# Patient Record
Sex: MALE | Race: BLACK OR AFRICAN AMERICAN | NOT HISPANIC OR LATINO | URBAN - METROPOLITAN AREA
[De-identification: names, ages, dates, MRNs, and addresses within clinical notes are randomized per-mention and may not be internally consistent; named-entity substitution may affect disease eponyms.]

---

## 2020-04-06 ENCOUNTER — EMERGENCY (EMERGENCY)
Facility: HOSPITAL | Age: 50
LOS: 1 days | Discharge: HOME | End: 2020-04-06
Attending: STUDENT IN AN ORGANIZED HEALTH CARE EDUCATION/TRAINING PROGRAM | Admitting: EMERGENCY MEDICINE
Payer: COMMERCIAL

## 2020-04-06 VITALS
HEART RATE: 60 BPM | DIASTOLIC BLOOD PRESSURE: 85 MMHG | OXYGEN SATURATION: 99 % | RESPIRATION RATE: 16 BRPM | SYSTOLIC BLOOD PRESSURE: 123 MMHG | TEMPERATURE: 98 F

## 2020-04-06 VITALS
OXYGEN SATURATION: 98 % | TEMPERATURE: 97 F | DIASTOLIC BLOOD PRESSURE: 71 MMHG | SYSTOLIC BLOOD PRESSURE: 130 MMHG | HEART RATE: 65 BPM | RESPIRATION RATE: 16 BRPM

## 2020-04-06 DIAGNOSIS — R19.7 DIARRHEA, UNSPECIFIED: ICD-10-CM

## 2020-04-06 DIAGNOSIS — R10.13 EPIGASTRIC PAIN: ICD-10-CM

## 2020-04-06 DIAGNOSIS — R91.1 SOLITARY PULMONARY NODULE: ICD-10-CM

## 2020-04-06 DIAGNOSIS — B20 HUMAN IMMUNODEFICIENCY VIRUS [HIV] DISEASE: ICD-10-CM

## 2020-04-06 LAB
ALBUMIN SERPL ELPH-MCNC: 3.8 G/DL — SIGNIFICANT CHANGE UP (ref 3.4–5)
ALP SERPL-CCNC: 99 U/L — SIGNIFICANT CHANGE UP (ref 40–120)
ALT FLD-CCNC: 53 U/L — HIGH (ref 12–42)
ANION GAP SERPL CALC-SCNC: 8 MMOL/L — LOW (ref 9–16)
AST SERPL-CCNC: 40 U/L — HIGH (ref 15–37)
BASOPHILS # BLD AUTO: 0.04 K/UL — SIGNIFICANT CHANGE UP (ref 0–0.2)
BASOPHILS NFR BLD AUTO: 1.1 % — SIGNIFICANT CHANGE UP (ref 0–2)
BILIRUB SERPL-MCNC: 0.2 MG/DL — SIGNIFICANT CHANGE UP (ref 0.2–1.2)
BUN SERPL-MCNC: 23 MG/DL — SIGNIFICANT CHANGE UP (ref 7–23)
CALCIUM SERPL-MCNC: 8.7 MG/DL — SIGNIFICANT CHANGE UP (ref 8.5–10.5)
CHLORIDE SERPL-SCNC: 104 MMOL/L — SIGNIFICANT CHANGE UP (ref 96–108)
CO2 SERPL-SCNC: 23 MMOL/L — SIGNIFICANT CHANGE UP (ref 22–31)
CREAT SERPL-MCNC: 1.1 MG/DL — SIGNIFICANT CHANGE UP (ref 0.5–1.3)
EOSINOPHIL # BLD AUTO: 0.09 K/UL — SIGNIFICANT CHANGE UP (ref 0–0.5)
EOSINOPHIL NFR BLD AUTO: 2.4 % — SIGNIFICANT CHANGE UP (ref 0–6)
GLUCOSE SERPL-MCNC: 80 MG/DL — SIGNIFICANT CHANGE UP (ref 70–99)
HCT VFR BLD CALC: 45.6 % — SIGNIFICANT CHANGE UP (ref 39–50)
HGB BLD-MCNC: 15.6 G/DL — SIGNIFICANT CHANGE UP (ref 13–17)
IMM GRANULOCYTES NFR BLD AUTO: 0.3 % — SIGNIFICANT CHANGE UP (ref 0–1.5)
LACTATE SERPL-SCNC: 0.4 MMOL/L — SIGNIFICANT CHANGE UP (ref 0.4–2)
LIDOCAIN IGE QN: 131 U/L — SIGNIFICANT CHANGE UP (ref 73–393)
LYMPHOCYTES # BLD AUTO: 1.62 K/UL — SIGNIFICANT CHANGE UP (ref 1–3.3)
LYMPHOCYTES # BLD AUTO: 42.6 % — SIGNIFICANT CHANGE UP (ref 13–44)
MCHC RBC-ENTMCNC: 30.7 PG — SIGNIFICANT CHANGE UP (ref 27–34)
MCHC RBC-ENTMCNC: 34.2 GM/DL — SIGNIFICANT CHANGE UP (ref 32–36)
MCV RBC AUTO: 89.8 FL — SIGNIFICANT CHANGE UP (ref 80–100)
MONOCYTES # BLD AUTO: 0.61 K/UL — SIGNIFICANT CHANGE UP (ref 0–0.9)
MONOCYTES NFR BLD AUTO: 16.1 % — HIGH (ref 2–14)
NEUTROPHILS # BLD AUTO: 1.43 K/UL — LOW (ref 1.8–7.4)
NEUTROPHILS NFR BLD AUTO: 37.5 % — LOW (ref 43–77)
NRBC # BLD: 0 /100 WBCS — SIGNIFICANT CHANGE UP (ref 0–0)
PLATELET # BLD AUTO: 372 K/UL — SIGNIFICANT CHANGE UP (ref 150–400)
POTASSIUM SERPL-MCNC: 4.6 MMOL/L — SIGNIFICANT CHANGE UP (ref 3.5–5.3)
POTASSIUM SERPL-SCNC: 4.6 MMOL/L — SIGNIFICANT CHANGE UP (ref 3.5–5.3)
PROT SERPL-MCNC: 7.6 G/DL — SIGNIFICANT CHANGE UP (ref 6.4–8.2)
RBC # BLD: 5.08 M/UL — SIGNIFICANT CHANGE UP (ref 4.2–5.8)
RBC # FLD: 12.1 % — SIGNIFICANT CHANGE UP (ref 10.3–14.5)
SODIUM SERPL-SCNC: 135 MMOL/L — SIGNIFICANT CHANGE UP (ref 132–145)
WBC # BLD: 3.8 K/UL — SIGNIFICANT CHANGE UP (ref 3.8–10.5)
WBC # FLD AUTO: 3.8 K/UL — SIGNIFICANT CHANGE UP (ref 3.8–10.5)

## 2020-04-06 PROCEDURE — 99285 EMERGENCY DEPT VISIT HI MDM: CPT

## 2020-04-06 PROCEDURE — 93010 ELECTROCARDIOGRAM REPORT: CPT

## 2020-04-06 PROCEDURE — 74177 CT ABD & PELVIS W/CONTRAST: CPT | Mod: 26

## 2020-04-06 PROCEDURE — 71045 X-RAY EXAM CHEST 1 VIEW: CPT | Mod: 26

## 2020-04-06 RX ORDER — SODIUM CHLORIDE 9 MG/ML
1000 INJECTION INTRAMUSCULAR; INTRAVENOUS; SUBCUTANEOUS ONCE
Refills: 0 | Status: COMPLETED | OUTPATIENT
Start: 2020-04-06 | End: 2020-04-06

## 2020-04-06 RX ORDER — ACETAMINOPHEN 500 MG
975 TABLET ORAL ONCE
Refills: 0 | Status: COMPLETED | OUTPATIENT
Start: 2020-04-06 | End: 2020-04-06

## 2020-04-06 RX ADMIN — Medication 975 MILLIGRAM(S): at 13:04

## 2020-04-06 RX ADMIN — SODIUM CHLORIDE 2000 MILLILITER(S): 9 INJECTION INTRAMUSCULAR; INTRAVENOUS; SUBCUTANEOUS at 13:04

## 2020-04-06 RX ADMIN — Medication 30 MILLILITER(S): at 13:04

## 2020-04-06 NOTE — ED PROVIDER NOTE - CLINICAL SUMMARY MEDICAL DECISION MAKING FREE TEXT BOX
pt w/ 3 days abd pain, diarrhea, possible foodborne illness, r/o colitis, pancreatitis, hb pathology, less likely gastritis  belly labs, lactate, ctap, will provide supportive care, serial exam and ED observation period

## 2020-04-06 NOTE — ED PROVIDER NOTE - PHYSICAL EXAMINATION
PHYSICAL EXAM:    Constitutional: awake, alert, NAD  Eyes: EOMI, no conj injection  HENT: NC AT  Back: no c/t/l spine ttp  Respiratory: no respiratory distress, breath sounds equal b/l, no wheezing, rhonchi or stridor.   Cardiovascular: RRR nml S1S2  Gastrointestinal: soft, no masses, mid abdominal tenderness from epigastric region to umbilicus, nondistended. No guarding or rebound.   Extremities: no peripheral edema  Neurological: AAOx3, CN II-XII grossly intact, no focal numbness or weakness  Skin: no rash  Musculoskeletal: no gross deformity

## 2020-04-06 NOTE — ED PROVIDER NOTE - PROGRESS NOTE DETAILS
pt feeling better, pain improved. serial abd exam benign. labs/ct w/o acute findings. discussed finding of lung nodule and need for outpt f/u. meds sent to pt's preferred pharmacy. return precautions addressed

## 2020-04-06 NOTE — ED PROVIDER NOTE - CARE PLAN
Principal Discharge DX:	Diarrhea, unspecified type  Secondary Diagnosis:	Lung nodule seen on imaging study

## 2020-04-06 NOTE — ED PROVIDER NOTE - OBJECTIVE STATEMENT
Patient is a 49 year old male presenting today with 3 days of abdominal pain from epigastric area inferiorly to the umbilicus. States that today is the worst day so far. He also reports watery diarrhea up to five or six times per day. He denies nausea and vomiting, no recent travels, no recent antibiotics, no fevers, chills, or body aches, denies dysuria and rashes. He denies h/o HTN or DM. Patient has a history of HIV on antivirals and reports viral load 96 and CD4 count over 400. He also reports h/o hepatitis C but is not yet being treated. Patient admits to smoking cigarettes, and illicit drug use including marijuana and cocaine; he denies IV drug use. He says he can tolerate PO intake, and that he is unsure if eating triggers the pain. Patient reports that about one week ago he ate some possibly  food. He relays that he took ibuprofen last night, and that he has not eaten today. Patient is a 49 year old male sasha HIV on RIOS (VL=96), Hep C presenting today with 3 days of abdominal pain from epigastric area inferiorly to the umbilicus. States that today is the worst day so far. He also reports watery diarrhea up to five or six times per day. He denies nausea and vomiting, no recent travels, no recent antibiotics, no fevers, chills, or body aches, denies dysuria and rashes. He denies h/o HTN or DM.  Patient admits to smoking cigarettes, and illicit drug use including marijuana and cocaine; he denies IV drug use. He says he can tolerate PO intake, and that he is unsure if eating triggers the pain. Patient reports that about one week ago he ate some possibly  food. He relays that he took ibuprofen last night which improved his pain, and that he has not eaten today.

## 2020-04-06 NOTE — ED PROVIDER NOTE - NSFOLLOWUPINSTRUCTIONS_ED_ALL_ED_FT
Take maalox for abdominal pain/diarrhea and bentyl as needed for abdominal pain. Have a bland diet until symptoms improve. Follow up with primary care doctor for further workup of lung nodule.  Return for worsening pain, concern for dehydration, fever or other concerning symptoms.    Abdominal Pain    Many things can cause abdominal pain. Many times, abdominal pain is not caused by a disease and will improve without treatment. Your health care provider will do a physical exam to determine if there is a dangerous cause of your pain; blood tests and imaging may help determine the cause of your pain. However, in many cases, no cause may be found and you may need further testing as an outpatient. Monitor your abdominal pain for any changes.     SEEK IMMEDIATE MEDICAL CARE IF YOU HAVE ANY OF THE FOLLOWING SYMPTOMS: worsening abdominal pain, uncontrollable vomiting, profuse diarrhea, inability to have bowel movements or pass gas, black or bloody stools, fever accompanying chest pain or back pain, or fainting. These symptoms may represent a serious problem that is an emergency. Do not wait to see if the symptoms will go away. Get medical help right away. Call 911 and do not drive yourself to the hospital.      Diarrhea    Diarrhea is frequent loose or watery bowel movements that has many causes. Diarrhea can make you feel weak and cause you to become dehydrated. Diarrhea typically lasts 2–3 days, but can last longer if it is a sign of something more serious. Drink clear fluids to prevent dehydration. Eat bland, easy-to-digest foods as tolerated.     SEEK IMMEDIATE MEDICAL CARE IF YOU HAVE ANY OF THE FOLLOWING SYMPTOMS: high fevers, lightheadedness/dizziness, chest pain, black or bloody stools, shortness of breath, severe abdominal or back pain, or any signs of dehydration.

## 2020-04-06 NOTE — ED PROVIDER NOTE - PATIENT PORTAL LINK FT
You can access the FollowMyHealth Patient Portal offered by [facility] by registering at the following website: [email or web address]. By joining [employer]’s FollowMyHealth portal, you will also be able to view your health information using other applications (apps) compatible with our system.

## 2020-04-06 NOTE — ED ADULT NURSE NOTE - OBJECTIVE STATEMENT
48 y/o M c/o abd pain x3 days with diarrhea. denies fevers, nausea, vomiting, sick contacts. hx hiv.

## 2023-06-21 ENCOUNTER — EMERGENCY (EMERGENCY)
Facility: HOSPITAL | Age: 53
LOS: 1 days | Discharge: HOME | End: 2023-06-21
Admitting: EMERGENCY MEDICINE
Payer: COMMERCIAL

## 2023-06-21 VITALS
DIASTOLIC BLOOD PRESSURE: 74 MMHG | RESPIRATION RATE: 16 BRPM | HEIGHT: 74 IN | HEART RATE: 75 BPM | SYSTOLIC BLOOD PRESSURE: 131 MMHG | OXYGEN SATURATION: 97 % | TEMPERATURE: 98 F | WEIGHT: 196.21 LBS

## 2023-06-21 PROBLEM — B19.20 UNSPECIFIED VIRAL HEPATITIS C WITHOUT HEPATIC COMA: Chronic | Status: ACTIVE | Noted: 2020-04-06

## 2023-06-21 PROBLEM — B20 HUMAN IMMUNODEFICIENCY VIRUS [HIV] DISEASE: Chronic | Status: ACTIVE | Noted: 2020-04-06

## 2023-06-21 PROCEDURE — 10061 I&D ABSCESS COMP/MULTIPLE: CPT

## 2023-06-21 PROCEDURE — 99283 EMERGENCY DEPT VISIT LOW MDM: CPT | Mod: 25

## 2023-06-21 RX ORDER — CEPHALEXIN 500 MG
500 CAPSULE ORAL ONCE
Refills: 0 | Status: COMPLETED | OUTPATIENT
Start: 2023-06-21 | End: 2023-06-21

## 2023-06-21 RX ORDER — IBUPROFEN 200 MG
1 TABLET ORAL
Qty: 28 | Refills: 0
Start: 2023-06-21 | End: 2023-06-27

## 2023-06-21 RX ORDER — OXYCODONE AND ACETAMINOPHEN 5; 325 MG/1; MG/1
1 TABLET ORAL
Qty: 12 | Refills: 0
Start: 2023-06-21 | End: 2023-06-23

## 2023-06-21 RX ORDER — CEPHALEXIN 500 MG
1 CAPSULE ORAL
Qty: 28 | Refills: 0
Start: 2023-06-21 | End: 2023-06-27

## 2023-06-21 RX ORDER — IBUPROFEN 200 MG
800 TABLET ORAL ONCE
Refills: 0 | Status: COMPLETED | OUTPATIENT
Start: 2023-06-21 | End: 2023-06-21

## 2023-06-21 RX ADMIN — Medication 2 TABLET(S): at 10:57

## 2023-06-21 RX ADMIN — Medication 800 MILLIGRAM(S): at 10:57

## 2023-06-21 RX ADMIN — Medication 500 MILLIGRAM(S): at 10:57

## 2023-06-21 NOTE — ED ADULT NURSE NOTE - OBJECTIVE STATEMENT
Pt with abscess to left buttock, assessed by this RN and JERRI Carrillo simultaneously.  No abx taken, pt reports it is tender to the touch and has had some bloody drainage. No fevers, no chills, no IV drug use. has been present for approx 1.5 weeks and has been growing.

## 2023-06-21 NOTE — ED PROVIDER NOTE - OBJECTIVE STATEMENT
51 yo male with hx HIV presents c/o left buttock pain and swelling worsening over the past week, has been draining. no fever/chills. patient thinks it was an ingrown hair. denies hx abscess. denies DM or IVDU

## 2023-06-21 NOTE — ED PROVIDER NOTE - NSFOLLOWUPINSTRUCTIONS_ED_ALL_ED_FT
Keep wound clean and dry  If outside gauze on dressing becomes wet or saturated with liquid, please change outside gauze only to clean and dry gauze, please do not remove packing in the wound.   Please return to the emergency room in 2 days for packing removal and wound check    Take antibiotics as prescribed  Take pain medications as prescribed as needed    Return to the emergency room earlier for any concerning or worsening symptoms including fever, severe bleeding, severe pain, or any concerns.

## 2023-06-21 NOTE — ED PROVIDER NOTE - PATIENT PORTAL LINK FT
You can access the FollowMyHealth Patient Portal offered by [facility] by registering at the following website: [email or web address]. By joining [employer]’s FollowMyHealth portal, you will also be able to view your health information using other applications (apps) compatible with our system.

## 2023-06-21 NOTE — ED PROVIDER NOTE - CLINICAL SUMMARY MEDICAL DECISION MAKING FREE TEXT BOX
left buttock abscess and cellulitis with active drainage, i&d done with purulent drainage expressed, packing placed, rx bactrim/keflex x 7 days, rx analgesia sent to pharmacy. return in 2 days to ED for wound check. wound care discussed.

## 2023-06-21 NOTE — ED PROVIDER NOTE - PHYSICAL EXAMINATION
CONSTITUTIONAL: Well-appearing; well-nourished; in no apparent distress.   	HEAD: Normocephalic; atraumatic.   	GI: Soft; non-distended; non-tender  	Left buttock induration and fluctuance about 2 inch diameter with overlying erythema draining, +ttp. no crepitus. no anal involvement.   	SKIN: Normal for age and race; warm; dry; good turgor; no apparent lesions or rash.   	NEURO: A & O x 3; face symmetric; grossly unremarkable.   PSYCHOLOGICAL: The patient’s mood and manner are appropriate.

## 2023-06-21 NOTE — ED ADULT NURSE NOTE - NSFALLUNIVINTERV_ED_ALL_ED
Bed/Stretcher in lowest position, wheels locked, appropriate side rails in place/Call bell, personal items and telephone in reach/Instruct patient to call for assistance before getting out of bed/chair/stretcher/Non-slip footwear applied when patient is off stretcher/[place] to call system/Physically safe environment - no spills, clutter or unnecessary equipment/Purposeful proactive rounding/Room/bathroom lighting operational, light cord in reach

## 2023-06-23 ENCOUNTER — EMERGENCY (EMERGENCY)
Facility: HOSPITAL | Age: 53
LOS: 1 days | Discharge: HOME | End: 2023-06-23
Attending: EMERGENCY MEDICINE | Admitting: EMERGENCY MEDICINE
Payer: COMMERCIAL

## 2023-06-23 VITALS
WEIGHT: 210.1 LBS | OXYGEN SATURATION: 98 % | HEIGHT: 74 IN | SYSTOLIC BLOOD PRESSURE: 98 MMHG | HEART RATE: 79 BPM | DIASTOLIC BLOOD PRESSURE: 66 MMHG | RESPIRATION RATE: 16 BRPM | TEMPERATURE: 98 F

## 2023-06-23 DIAGNOSIS — B20 HUMAN IMMUNODEFICIENCY VIRUS [HIV] DISEASE: ICD-10-CM

## 2023-06-23 DIAGNOSIS — L03.317 CELLULITIS OF BUTTOCK: ICD-10-CM

## 2023-06-23 DIAGNOSIS — L02.31 CUTANEOUS ABSCESS OF BUTTOCK: ICD-10-CM

## 2023-06-23 DIAGNOSIS — Z86.19 PERSONAL HISTORY OF OTHER INFECTIOUS AND PARASITIC DISEASES: ICD-10-CM

## 2023-06-23 PROCEDURE — 99283 EMERGENCY DEPT VISIT LOW MDM: CPT

## 2023-06-23 NOTE — ED PROVIDER NOTE - PHYSICAL EXAMINATION
VITAL SIGNS: I have reviewed nursing notes and confirm.  CONST: Well-developed; well-nourished; No apparent distress.    SKIN: + healing abscess, clean based to R buttock cheek, no purulent d/c  PSYCH: Cooperative. Appropriate mood, language, and behavior.

## 2023-06-23 NOTE — ED PROVIDER NOTE - PATIENT PORTAL LINK FT
You can access the FollowMyHealth Patient Portal offered by [facility] by registering at the following website: [email or web address]. By joining [employer]’s FollowMyHealth portal, you will also be able to view your health information using other applications (apps) compatible with our system.

## 2023-06-23 NOTE — ED PROVIDER NOTE - OBJECTIVE STATEMENT
52-year-old man presenting for wound check of a buttock abscess that was I&D 2 days ago here.  He is taking the Bactrim as prescribed and says that the wound appears to be healing well.  He has HIV with low CD4 count.  He said when he started medication for hep C his counts dropped.  He thinks from a combination of drug reaction and missing several doses of HAART.  He is currently back on track with his HIV meds.  He said no fevers and chills and he says that his blood pressure can be low at times.

## 2023-06-23 NOTE — ED ADULT NURSE NOTE - OBJECTIVE STATEMENT
Patient presents for wound check of his left buttock abscess s/p I & D, is compliant with oral antibiotic. Denies fever, chills, pain, purulent drainage.

## 2023-06-23 NOTE — ED ADULT NURSE NOTE - NSFALLUNIVINTERV_ED_ALL_ED
Bed/Stretcher in lowest position, wheels locked, appropriate side rails in place/Call bell, personal items and telephone in reach/Instruct patient to call for assistance before getting out of bed/chair/stretcher/Non-slip footwear applied when patient is off stretcher/[place] to call system/Physically safe environment - no spills, clutter or unnecessary equipment/Purposeful proactive rounding/Room/bathroom lighting operational, light cord in reach

## 2023-06-23 NOTE — ED PROVIDER NOTE - CLINICAL SUMMARY MEDICAL DECISION MAKING FREE TEXT BOX
Patient here for wound check to left buttock abscess.  The wound is healing very well.  I lightly repacked it so that it does not drain onto his outfit today.  He can remove the packing in the bath later tonight or tomorrow.  His blood pressure was on the low side but he says that this is normal for him.  He has no systemic symptoms of infection.  I gave him strict return precautions.

## 2023-06-23 NOTE — ED PROVIDER NOTE - NSFOLLOWUPINSTRUCTIONS_ED_ALL_ED_FT
Please keep the packing and gauze in place during the day today so that any wound drainage does not soil her clothing.  Later tonight or tomorrow you can remove the gauze packing in the bath.  After that you can just apply regular dressings or large Band-Aids.  Please do warm soaks twice a day until the wound has almost completely closed up.    Have a low threshold to return to the ER if you develop fevers chills or any worsening symptoms.  Particularly since your T cells are still low.

## 2023-08-24 ENCOUNTER — EMERGENCY (EMERGENCY)
Facility: HOSPITAL | Age: 53
LOS: 1 days | Discharge: HOME | End: 2023-08-24
Admitting: EMERGENCY MEDICINE
Payer: COMMERCIAL

## 2023-08-24 VITALS
TEMPERATURE: 98 F | OXYGEN SATURATION: 98 % | HEIGHT: 73 IN | DIASTOLIC BLOOD PRESSURE: 80 MMHG | RESPIRATION RATE: 16 BRPM | SYSTOLIC BLOOD PRESSURE: 128 MMHG | HEART RATE: 73 BPM | WEIGHT: 184.09 LBS

## 2023-08-24 PROCEDURE — 73660 X-RAY EXAM OF TOE(S): CPT | Mod: 26,LT

## 2023-08-24 PROCEDURE — 99284 EMERGENCY DEPT VISIT MOD MDM: CPT

## 2023-08-24 RX ORDER — IBUPROFEN 200 MG
600 TABLET ORAL ONCE
Refills: 0 | Status: COMPLETED | OUTPATIENT
Start: 2023-08-24 | End: 2023-08-24

## 2023-08-24 RX ORDER — IBUPROFEN 200 MG
1 TABLET ORAL
Qty: 28 | Refills: 0
Start: 2023-08-24 | End: 2023-08-30

## 2023-08-24 RX ADMIN — Medication 600 MILLIGRAM(S): at 15:43

## 2023-08-24 NOTE — ED PROVIDER NOTE - NSFOLLOWUPINSTRUCTIONS_ED_ALL_ED_FT
Take Ibuprofen 600mg every 6-8 hours as needed for pain, take with food, and in addition you may take Tylenol 500 mg every 6-8 hours as needed for pain    Take antibiotics as prescribed    Follow up with podiatrist within 2-3 days    return for any concerning or worsening symptoms including fever, drainage, redness or any worsening symptoms.

## 2023-08-24 NOTE — ED PROVIDER NOTE - OBJECTIVE STATEMENT
53 yo male hx HIV c/o left big toe and swelling to the pulp unclear time frame, patient thinks it has been many weeks. patient is not sure if there has been any trauma. poor historian. denies fever/chills or drainage.

## 2023-08-24 NOTE — ED PROVIDER NOTE - PHYSICAL EXAMINATION
CONSTITUTIONAL: Well-appearing; well-nourished; in no apparent distress.   	HEAD: Normocephalic; atraumatic.   LLE: mild swelling to pulp of big toe with ttp, no induration or fluctuance, no erythema/break in skin. no paronychia. good ROM toes. good cap refill. rest of foot nontender. dpi. soft compartments.   	NEURO: A & O x 3; face symmetric; grossly unremarkable.   PSYCHOLOGICAL: The patient’s mood and manner are appropriate.

## 2023-08-24 NOTE — ED PROVIDER NOTE - CLINICAL SUMMARY MEDICAL DECISION MAKING FREE TEXT BOX
left big toe pain and swelling to pulp, unclear time frame, unclear if there has been trauma, xrays no fx. no obvious cellulitis or abscess. will start on augmentin empirically for possible soft tissue infection and advised f/u podiatry.

## 2023-08-24 NOTE — ED ADULT NURSE NOTE - NSFALLUNIVINTERV_ED_ALL_ED
Bed/Stretcher in lowest position, wheels locked, appropriate side rails in place/Call bell, personal items and telephone in reach/Instruct patient to call for assistance before getting out of bed/chair/stretcher/Non-slip footwear applied when patient is off stretcher/[place] to call system/Physically safe environment - no spills, clutter or unnecessary equipment/Purposeful proactive rounding/Room/bathroom lighting operational, light cord in reach

## 2023-08-24 NOTE — ED PROVIDER NOTE - NSFOLLOWUPCLINICS_GEN_ALL_ED_FT
[facility] Podiatry [facility]  Podiatry  [address] [place] [address]  Phone: [phone]  Fax:

## 2023-08-24 NOTE — ED PROVIDER NOTE - PATIENT PORTAL LINK FT
You can access the FollowMyHealth Patient Portal offered by [facility] by registering at the following website: [email or web address]. By joining [employer]’s FollowMyHealth portal, you will also be able to view your health information using other applications (apps) compatible with our system.

## 2023-08-28 DIAGNOSIS — M79.89 OTHER SPECIFIED SOFT TISSUE DISORDERS: ICD-10-CM

## 2023-08-28 DIAGNOSIS — Z21 ASYMPTOMATIC HUMAN IMMUNODEFICIENCY VIRUS [HIV] INFECTION STATUS: ICD-10-CM

## 2023-08-28 DIAGNOSIS — M79.675 PAIN IN LEFT TOE(S): ICD-10-CM

## 2023-08-31 ENCOUNTER — EMERGENCY (EMERGENCY)
Facility: HOSPITAL | Age: 53
LOS: 1 days | Discharge: HOME | End: 2023-08-31
Admitting: EMERGENCY MEDICINE
Payer: COMMERCIAL

## 2023-08-31 VITALS
HEIGHT: 73 IN | HEART RATE: 68 BPM | RESPIRATION RATE: 18 BRPM | OXYGEN SATURATION: 98 % | SYSTOLIC BLOOD PRESSURE: 131 MMHG | TEMPERATURE: 97 F | DIASTOLIC BLOOD PRESSURE: 85 MMHG

## 2023-08-31 PROCEDURE — 99283 EMERGENCY DEPT VISIT LOW MDM: CPT

## 2023-08-31 RX ORDER — BACITRACIN ZINC 500 UNIT/G
1 OINTMENT IN PACKET (EA) TOPICAL ONCE
Refills: 0 | Status: COMPLETED | OUTPATIENT
Start: 2023-08-31 | End: 2023-08-31

## 2023-08-31 RX ADMIN — Medication 1 APPLICATION(S): at 09:21

## 2023-08-31 NOTE — ED PROVIDER NOTE - PATIENT PORTAL LINK FT
You can access the FollowMyHealth Patient Portal offered by [facility] by registering at the following website: [email or web address]. By joining [employer]’s FollowMyHealth portal, you will also be able to view your health information using other applications (apps) compatible with our system.

## 2023-08-31 NOTE — ED PROVIDER NOTE - OBJECTIVE STATEMENT
53 y/o male c/o of toe pain s/p recent abx use stating he wants his wound dressed. Patient states he was not able to get a podiatry appointment. Patient with poor hygiene. Appears well NAD stable. denies chest pain,nausea,vomiting,diarrhea,fevers,chills,sob.

## 2023-08-31 NOTE — ED ADULT TRIAGE NOTE - CHIEF COMPLAINT QUOTE
Pt complaining of left 1st toe pain. Pt states that he finished antibiotic course but was unable to follow up with podiatrist.

## 2023-08-31 NOTE — ED PROVIDER NOTE - CLINICAL SUMMARY MEDICAL DECISION MAKING FREE TEXT BOX
Patient here with left toe discomfort.   Has no followed up with podiatry  Exam with small wound   No signs of infection      plan: bacitracin and podiatry follow up

## 2023-09-03 DIAGNOSIS — M79.675 PAIN IN LEFT TOE(S): ICD-10-CM

## 2023-09-03 DIAGNOSIS — Z21 ASYMPTOMATIC HUMAN IMMUNODEFICIENCY VIRUS [HIV] INFECTION STATUS: ICD-10-CM

## 2023-09-03 DIAGNOSIS — Z86.19 PERSONAL HISTORY OF OTHER INFECTIOUS AND PARASITIC DISEASES: ICD-10-CM

## 2023-10-16 ENCOUNTER — EMERGENCY (EMERGENCY)
Facility: HOSPITAL | Age: 53
LOS: 1 days | Discharge: HOME | End: 2023-10-16
Attending: EMERGENCY MEDICINE | Admitting: EMERGENCY MEDICINE
Payer: COMMERCIAL

## 2023-10-16 VITALS
SYSTOLIC BLOOD PRESSURE: 133 MMHG | OXYGEN SATURATION: 96 % | RESPIRATION RATE: 18 BRPM | DIASTOLIC BLOOD PRESSURE: 84 MMHG | TEMPERATURE: 98 F | HEART RATE: 88 BPM | HEIGHT: 73 IN | WEIGHT: 190.04 LBS

## 2023-10-16 PROCEDURE — 99284 EMERGENCY DEPT VISIT MOD MDM: CPT | Mod: 25

## 2023-10-16 PROCEDURE — 10061 I&D ABSCESS COMP/MULTIPLE: CPT

## 2023-10-16 RX ORDER — CHLORHEXIDINE GLUCONATE 213 G/1000ML
1 SOLUTION TOPICAL
Qty: 1 | Refills: 0
Start: 2023-10-16 | End: 2023-10-25

## 2023-10-16 RX ORDER — MUPIROCIN 20 MG/G
1 OINTMENT TOPICAL
Qty: 1 | Refills: 1
Start: 2023-10-16 | End: 2023-11-04

## 2023-10-16 NOTE — ED PROVIDER NOTE - PHYSICAL EXAMINATION
VITAL SIGNS: I have reviewed nursing notes and confirm.  CONST: Well-developed; well-nourished; No apparent distress.    SKIN: Skin is normal temperature; + small abscess//large pustule to R cheek.   PSYCH: Cooperative. Appropriate mood, language, and behavior.

## 2023-10-16 NOTE — ED ADULT NURSE NOTE - NSICDXFAMILYHX_GEN_ALL_CORE_FT
What Type Of Note Output Would You Prefer (Optional)?: Bullet Format How Severe Is Your Acne?: mild Is This A New Presentation, Or A Follow-Up?: Acne Additional Comments (Use Complete Sentences): Breakouts on nose FAMILY HISTORY:  No pertinent family history in first degree relatives

## 2023-10-16 NOTE — ED PROVIDER NOTE - CLINICAL SUMMARY MEDICAL DECISION MAKING FREE TEXT BOX
Patient presenting with small right facial abscess.  It is oozing a small amount of pus currently.  We will send wound culture and I&D the abscess as well as start p.o. antibiotics with Bactroban and Hibiclens for presumed MRSA.

## 2023-10-16 NOTE — ED PROVIDER NOTE - OBJECTIVE STATEMENT
52-year-old man presenting with an abscess to the right cheek getting bigger over the last 4 days.  He has a history of HIV and hepatitis with a low viral load.  He also has a history of multiple cutaneous abscesses but does not know if he has a history of MRSA.  He said no fevers or chills and no other symptoms.

## 2023-10-16 NOTE — ED ADULT NURSE NOTE - NSFALLUNIVINTERV_ED_ALL_ED
Bed/Stretcher in lowest position, wheels locked, appropriate side rails in place/Call bell, personal items and telephone in reach/Instruct patient to call for assistance before getting out of bed/chair/stretcher/Non-slip footwear applied when patient is off stretcher/[place] to call system/Physically safe environment - no spills, clutter or unnecessary equipment/Purposeful proactive rounding/Room/bathroom lighting operational, light cord in reach

## 2023-10-16 NOTE — ED ADULT TRIAGE NOTE - PAIN RATING/NUMBER SCALE (0-10): ACTIVITY
[facility]  Outpatient Occupational Therapy    Type of visit: Evaluation  Date of Service: 10/26/2017  Referring Provider: Yadira Rivera  Date of Referral: 9/19/17    Referral Reason: Janie Coronel was referred to the [facility] due to the following concerns: Oral aversion  Patient accompanied to visit by: Mother    Patient History:    Historical information was gathered from a questionnaire filled out prior to the evaluation as well as parent/caregiver report during today s visit.    Birth/Medical History: Born full term with no complications during pregnancy or birth. He met his developmental milestones at age appropriate times. PMH: Multiple ER visits for emesis and uncomplicated asthma. Appears that mom is looking to transfer cares from [facility] to [place]. Mom reports it was recommended in the past he have an adeoidectomy. He reports he hiccups a lot.    Developmental History: Mom's report was unclear as to which feeding therapy he has done in the past but it was at [facility] ~2 years ago. They recommended GI doctor. Mom reports they say GI doctor who prescribe medication. Blood test and X-ray were normal. He met his developmental milestones at age appropriate times. No other therapy services in the past. He lives with mom and has a  at times. He attends  where he receives no therapy services.     Feeding History: Mom reports he has had emesis every morning since he was a baby. He throws up with toothbrushing and at the sight of food. Mom holds his hands down to brush his teeth before eating breakfast. It can take 1-2 hours to eat a meal. He sits at the table and feet are hanging off the chair. Mom force feeds him otherwise he won't eat. She has to close the bathroom door during meals or he will run to the bathroom to throw up. He drinks milk or juice from sippy or open cup. He self feeds grapes, muffins, and chips.  He refuses to eat oatmeal with cream and sugar. He uses a spoon and fork to feed himself. She notes he will pocket food. He struggles with constipation. He doesn't vomit at school as they don't force him to eat. He drinks milk or water at school. He doesn't like sweets, candy, or cookies. He is offered pureed soup after school. Mother reports he doesn't like any fruit and vegetables. Drinks about 1 vanilla Ensure daily - half after school, half with dinner.     Allergies: No known allergies    Medications: Vitamins, Miralax     Parent Goals: To increase oral intake and variety of intake.     Additional Occupational Profile Information (patterns of daily living, interests, values and needs):     Clinical Observations:    Neuromusculoskeletal  Posture: Posture is appropriate for success with feeding    Fine Motor Skills: Immature grasping pattern. He used a fisted and pronate digital grasp on the crayon. He also switched hands when coloring. He was able to draw a [place] but had overlap greater than 1/4 inch. He traced a square with fair form. He was able to write letter M with good form. He was independent with placing large beads onto the string. He needed minimum assistance with putting together a 9 piece interlocking puzzle.     Oral Motor Skills: Close to age appropriate for mastication skills on textures that were trialled in today's session. Please see SLP report for further details.    Self Care Performance  Drinks well from open mouth cup    Sensory  Oral defensiveness, Orally hypersensitive, Distresses with tooth-brushing, Picky with food textures, Picky with food tastes, Withdraws from tactile play and Withdraws from difficult food tasks. He was willing to touch the bubble water and interact with this. He wiped hands off quickly after. He tolerates hair washing and hair cuts with no challenges.     Behavior  Happy and engaged throughout visit and Attempted all foods    Pain  No pain  noted/reported    Clinical Impressions:  Treatment Diagnosis: Feeding impairment    Impression: Janie is a sweet 6 year and 1 month old male who presents to Feeding clinic due to oral aversion. Janie presents to OT with delays in fine motor, self-care skills, oral aversion secondary to force feeding, and sensory processing delays. He presents with age appropriate gross motor skills. He would benefit from continued OT intervention to progress these areas of delay. He would also benefit from further SLP intervention to progress his oral motor skills.     Assessment of Occupational Performance: 1-3 Performance Deficits  Identified Performance Deficits (ie: feeding, social skills): Feeding, fine motor, grooming/hygiene  Clinical Decision Making (Complexity): Low complexity    Recommendations/Plan of Care:   Patient would benefit from interventions to enhance safety and independence in self care, rehab potential good for stated goals.  Occupational therapy intervention indicated.  Frequency: 2x/month, Duration: 6 months    Goals:   By end of session, family/caregiver will verbalize understanding of evaluation results and implications for functional performance.  By end of session, family/caregiver will verbalize/demonstrate understanding of home program.  Goal 1: By 1/26/2018 Janie will demonstrate improved sensory processing and exploration by attending to and participating in 1 newly introduced sensory activity in 75% of therapy sessions without resistance or absenting activity.   Goal 2: By 1/26/2018 Janie will improve oral sensory exploration by tasting 1 new food 50% of trials in therapy.   Goal 3: By 1/26/2018 Janie will touch a non-preferred food with his fingers 2 out of 3 trials with minimal aversive responses.   Goal 4: By 1/26/2018 Janie will demonstrate improved tolerance for tooth brushing with decreased refusals and avoidance per parent report by 50%.  Goal 5: By 1/26/2018 Janie will use a  3-point grasp on a writing utensil to imitate a square and triangle in 50% opportunities in therapy.     Treatment and Education Provided:  Educational Assessment:  Learners: Mother  Barriers to Learning: Cultural barriers noted    Skilled Intervention:   A variety of foods were trialed today using the SOS approach (Sequential Oral Sensory): He sat at the table for the following food trials: He accepted and ate a mini muffin (birthday cake flavor) with no aversion and appropriate mastication skills noted. He drank water from an open cup with no signs of aspiration. He accepted and ate a Ritz cheese cracker with no aversion. He accepted and ate a dried ruby slice with cues for smelling, kissing, licking and then taking a bite. He stirred peach yogurt with a spoon and refused to smell it. He watched therapist draw in yogurt and appeared to like this activity but refused to participate.      Parents were educated in the following areas: Importance of daily opportunities for messy play, Parental modeling of appropriate eating behaviors, Importance of discontinuing force feeding, and Providing specific praise to encourage/teach/reinforce desired actions  Written education materials on the steps to eating were provided. Tactile activities handout given.     Response to Treatment/Recommendations: Mom verbalized understanding of home programming recommendations but will benefit from further education.     Goal Attainment: All goals met    Treatment provided this date:   Therapeutic activities, 7 minutes    Risks and benefits of evaluation/treatment have been explained.  Family/caregiver is in agreement with Plan of Care.    Evaluation time: 20  Treatment time: 7  Total contact time: 27    It was a pleasure to meet Janie and his family. If you have questions or concerns regarding this report please contact me at [phone] or [email or web address].    Signature/Credentials: Laura Ponce MA, OTR/L  Date:  10/26/2017       7 (severe pain)

## 2023-10-16 NOTE — ED PROVIDER NOTE - NSFOLLOWUPINSTRUCTIONS_ED_ALL_ED_FT
Abscess- possible MRSA     Based on you having recurrent skin abscesses, I suspect that you have methicillin-resistant Staphylococcus aureus or MRSA, this is a common antibiotic resistant skin bacteria. I have sent a wound culture and the results should be available in the next few days.  You will get a phone call if we need to change antibiotics. You can verify the result in the patient portal.    An abscess is an infected area that contains a collection of pus and debris. It can occur in almost any part of the body and occurs when the tissue gets infection. Symptoms include a painful mass that is red, warm, tender that might break open and HAVE drainage. If your health care provider gave you antibiotics make sure to take the full course and do not stop even if feeling better.     SEEK IMMEDIATE MEDICAL CARE IF YOU HAVE ANY OF THE FOLLOWING SYMPTOMS: chills, fever, muscle aches, or red streaking from the area.     Please apply the topical antibiotic to the wound twice a day as well as to any other wound that you have on your body and inside your nostrils for the next 10 days.  During this time also use chlorhexidine body wash as a facial wash and shampoo and body wash every day.  These 2 extra measures are done while taking oral antibiotics in an effort to eradicate the MRSA from your body.

## 2023-10-18 DIAGNOSIS — L02.01 CUTANEOUS ABSCESS OF FACE: ICD-10-CM

## 2023-10-18 DIAGNOSIS — Z86.19 PERSONAL HISTORY OF OTHER INFECTIOUS AND PARASITIC DISEASES: ICD-10-CM

## 2023-10-18 DIAGNOSIS — Z21 ASYMPTOMATIC HUMAN IMMUNODEFICIENCY VIRUS [HIV] INFECTION STATUS: ICD-10-CM

## 2023-10-19 LAB
-  AMPICILLIN/SULBACTAM: SIGNIFICANT CHANGE UP
-  AMPICILLIN/SULBACTAM: SIGNIFICANT CHANGE UP
-  CEFAZOLIN: SIGNIFICANT CHANGE UP
-  CEFAZOLIN: SIGNIFICANT CHANGE UP
-  CLINDAMYCIN: SIGNIFICANT CHANGE UP
-  CLINDAMYCIN: SIGNIFICANT CHANGE UP
-  DAPTOMYCIN: SIGNIFICANT CHANGE UP
-  DAPTOMYCIN: SIGNIFICANT CHANGE UP
-  ERYTHROMYCIN: SIGNIFICANT CHANGE UP
-  ERYTHROMYCIN: SIGNIFICANT CHANGE UP
-  GENTAMICIN: SIGNIFICANT CHANGE UP
-  GENTAMICIN: SIGNIFICANT CHANGE UP
-  LINEZOLID: SIGNIFICANT CHANGE UP
-  LINEZOLID: SIGNIFICANT CHANGE UP
-  OXACILLIN: SIGNIFICANT CHANGE UP
-  OXACILLIN: SIGNIFICANT CHANGE UP
-  PENICILLIN: SIGNIFICANT CHANGE UP
-  PENICILLIN: SIGNIFICANT CHANGE UP
-  RIFAMPIN: SIGNIFICANT CHANGE UP
-  RIFAMPIN: SIGNIFICANT CHANGE UP
-  TETRACYCLINE: SIGNIFICANT CHANGE UP
-  TETRACYCLINE: SIGNIFICANT CHANGE UP
-  TRIMETHOPRIM/SULFAMETHOXAZOLE: SIGNIFICANT CHANGE UP
-  TRIMETHOPRIM/SULFAMETHOXAZOLE: SIGNIFICANT CHANGE UP
-  VANCOMYCIN: SIGNIFICANT CHANGE UP
-  VANCOMYCIN: SIGNIFICANT CHANGE UP
METHOD TYPE: SIGNIFICANT CHANGE UP
METHOD TYPE: SIGNIFICANT CHANGE UP

## 2023-10-22 LAB
CULTURE RESULTS: SIGNIFICANT CHANGE UP
CULTURE RESULTS: SIGNIFICANT CHANGE UP
ORGANISM # SPEC MICROSCOPIC CNT: SIGNIFICANT CHANGE UP
SPECIMEN SOURCE: SIGNIFICANT CHANGE UP
SPECIMEN SOURCE: SIGNIFICANT CHANGE UP

## 2025-06-16 ENCOUNTER — EMERGENCY (EMERGENCY)
Facility: HOSPITAL | Age: 55
LOS: 1 days | End: 2025-06-16
Attending: EMERGENCY MEDICINE | Admitting: EMERGENCY MEDICINE
Payer: COMMERCIAL

## 2025-06-16 VITALS
DIASTOLIC BLOOD PRESSURE: 84 MMHG | WEIGHT: 199.96 LBS | HEART RATE: 82 BPM | TEMPERATURE: 98 F | OXYGEN SATURATION: 98 % | SYSTOLIC BLOOD PRESSURE: 130 MMHG | RESPIRATION RATE: 18 BRPM

## 2025-06-16 PROCEDURE — 99283 EMERGENCY DEPT VISIT LOW MDM: CPT

## 2025-06-16 NOTE — ED PROVIDER NOTE - CLINICAL SUMMARY MEDICAL DECISION MAKING FREE TEXT BOX
54 year old male with painful area of mucosal cheek. PE as above.  PE consistent with canker sore. advised salt water rinses, topical anesthetic. f/u with PMD. return precautions discussed.

## 2025-06-16 NOTE — ED ADULT NURSE NOTE - OBJECTIVE STATEMENT
Received patient alert and oriented x 4 denies chest pain or SOB, no cardiac or respiratory distress noted. Patient stated that reason for ER visit is due to tongue pain.

## 2025-06-16 NOTE — ED PROVIDER NOTE - PATIENT PORTAL LINK FT
You can access the FollowMyHealth Patient Portal offered by [facility] by registering at the following website: [email or web address]. By joining [facility]’s FollowMyHealth portal, you will also be able to view your health information using other applications (apps) compatible with our system.

## 2025-06-16 NOTE — ED ADULT NURSE NOTE - NSFALLUNIVINTERV_ED_ALL_ED
[facility]      Infusion Center-Outpatient Pavilion   [address] [place] [address]   [phone]    Hours of Operation  Monday -Friday 7:00am - 5:30pm  Saturday 8:00am- 11:30am     Scheduling  P:[phone]  F: [phone]     Latoya Tyler RN,BSN,OCN-  of Clinical Operations: [phone]      ** Starting 2021**  If you are more than 1 hour late to your scheduled appointment, the appointment will be canceled and rescheduled    If you are running late to your appointment, please call the phone number listed above to inform us.  Thank you!          **If your infusion requires blood work be sure to have labs drawn 24-48 hrs prior to your scheduled appointment **       Outpatient Pavilion Lab Hours: Located On The First Floor : Walk In Only  Monday-Friday: 6:00 am-6:30 pm  Saturday: 6:00 am- 2:30 pm   : Closed             Central [phone]     Bed/Stretcher in lowest position, wheels locked, appropriate side rails in place/Call bell, personal items and telephone in reach/Instruct patient to call for assistance before getting out of bed/chair/stretcher/Non-slip footwear applied when patient is off stretcher/[place] to call system/Physically safe environment - no spills, clutter or unnecessary equipment/Purposeful proactive rounding/Room/bathroom lighting operational, light cord in reach

## 2025-06-16 NOTE — ED PROVIDER NOTE - NSFOLLOWUPINSTRUCTIONS_ED_ALL_ED_FT
Canker Sores    WHAT YOU NEED TO KNOW:    What are canker sores? Canker sores are small ulcers that develop inside your mouth. Ulcers are open sores that may be shallow or deep. You may have 1 or more sores at a time, and they may grow in clusters.    What increases my risk for canker sores? The cause of canker sores is not known. Your risk is increased if anyone in your family gets canker sores. Any of the following may also increase your risk:    Conditions such as celiac disease, HIV, lupus, or Crohn disease    Hormone changes during a woman's monthly period    Allergies or sensitivities to foods    Mouth injuries caused by biting, dentures, braces, or brushing your teeth too hard    Medicines such as NSAIDs, chemotherapy, and some blood pressure medicines    Low levels of iron, zinc, vitamin B, vitamin D or folic acid    Stress, depression, or anxiety  What are the signs and symptoms of canker sores?    One or more sores on the back or floor of your mouth, the inner side of your cheeks and lips, or under your tongue    Round or oval-shaped red sores that may have a gray or yellow center    Pain or burning in your mouth    Difficulty chewing and swallowing  How are canker sores diagnosed? Your healthcare provider will ask about your symptoms and examine your mouth. Your provider will ask about your medical history and if you take any medicines. Tell your provider if you or anyone in your family gets canker sores.    How are canker sores treated? Canker sores cannot be cured. The sores may go away for a time, and then come back again. Medicines to decrease pain and inflammation may be given.    How can I manage my symptoms and prevent canker sores?    Eat soft, plain foods until your canker sores heal. Foods such as eggs, yogurt, soups, rice, and pasta may be easier for you to eat. Do not eat crunchy, dry, salty, or spicy foods. Examples include dry toast, popcorn, or chips. These can cause pain. Do not have foods or drinks that contain citric acid, such as grapefruit or orange juice. These may make your pain worse or cause more sores to form.    Gently brush your teeth and tongue. Use a soft toothbrush. Avoid using toothpaste that contains sodium lauryl sulfate (SLS). Toothpaste with SLS can increase your pain, make your sores heal slower, and cause more sores to form.    Care for your mouth. Clean dentures, mouth guards, and devices to straighten or whiten teeth often. Tell your dentist if your braces or dentures do not feel comfortable. Your dentist can help these devices fit better. Regular mouth care can help prevent sores.    Manage other health conditions. Follow your healthcare provider's advice on how to manage conditions that increase your risk of canker sores. Ask your provider about medicines you are taking and if they cause canker sores.  When should I call my doctor?    You cannot eat or drink because of your mouth pain.    Your canker sores are not gone after 3 to 4 weeks.    Your pain does not go away after you take medicines.    Your sores are getting worse, or you are getting more sores, even after treatment.    You have questions or concerns about your condition or care.  CARE AGREEMENT:    You have the right to help plan your care. Learn about your health condition and how it may be treated. Discuss treatment options with your healthcare providers to decide what care you want to receive. You always have the right to refuse treatment.

## 2025-06-18 DIAGNOSIS — K14.6 GLOSSODYNIA: ICD-10-CM

## 2025-06-18 DIAGNOSIS — K12.0 RECURRENT ORAL APHTHAE: ICD-10-CM
